# Patient Record
Sex: FEMALE | Race: WHITE | NOT HISPANIC OR LATINO | Employment: UNEMPLOYED | URBAN - METROPOLITAN AREA
[De-identification: names, ages, dates, MRNs, and addresses within clinical notes are randomized per-mention and may not be internally consistent; named-entity substitution may affect disease eponyms.]

---
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
## 2023-02-08 ENCOUNTER — TELEPHONE (OUTPATIENT)
Dept: PEDIATRICS CLINIC | Facility: CLINIC | Age: 18
End: 2023-02-08

## 2023-02-08 NOTE — TELEPHONE ENCOUNTER
Left message for patient to call office to either schedule well visit or we can provide number for family doctor for patient to transfer  Patient will be 19 soon

## 2024-01-05 ENCOUNTER — TELEPHONE (OUTPATIENT)
Dept: PEDIATRICS CLINIC | Facility: CLINIC | Age: 19
End: 2024-01-05

## 2024-01-05 NOTE — TELEPHONE ENCOUNTER
Spoke to patient she will be looking for a new PCP because she turns 19 next month. I gave her [facility] names for her to get into contact with.

## 2024-01-08 NOTE — TELEPHONE ENCOUNTER
01/08/24 1:22 PM        The office's request has been received, reviewed, and the patient chart updated. The PCP has successfully been removed with a patient attribution note. This message will now be completed.        Thank you  China Ribera